# Patient Record
(demographics unavailable — no encounter records)

---

## 2024-10-29 NOTE — HISTORY OF PRESENT ILLNESS
[de-identified] : Patient is here for evaluation of left shoulder pain Affecting quality of life Wakes up at night due to pain  had injection and PT, by another dr in surgeon  NAD Left shoulder: TTP ant GH joint, bicipital groove FF 0-175 ER 60 IR T12 5/5 strength scapular abduction, ER, IR Pos Impingement Pos Steele Pos Cross Arm Adduction Negative instability  XRay left shoulder negative for fracture, dislocation, very mild GH arthritis  Plan went over findings explained the imaging since he has failed cons tx will get an mri left shoulder fu after mri

## 2024-12-05 NOTE — HISTORY OF PRESENT ILLNESS
[de-identified] : Patient is here for evaluation of left shoulder pain Affecting quality of life Wakes up at night due to pain  had injection and PT, by another dr in surgeon  NAD Left shoulder: TTP ant GH joint, bicipital groove FF 0-175 ER 60 IR T12 5/5 strength scapular abduction, ER, IR Pos Impingement Pos Steele Pos Cross Arm Adduction Negative instability  XRay left shoulder negative for fracture, dislocation, very mild GH arthritis  mri left shoudler: high grade to near complete R tear, labral tear, ac arthritis  Plan went over findings explained the imaging op vs nonop and r/b/a explained detail will proceed with surgery   left shoulder arthroscopy, rotator cuff repair, decompression, distal clavicle excision, possible open biceps tenodesis  Operative and nonoperative options discussed with patient. Surgical risks, benefits, and alternatives explained. Surgical risks include but are not exclusive to bleeding, infection, neurovascular damage, continued pain, stiffness, scarring, rsd, dvt/pe, potential failure of surgery that may require further surgery in the future. I went over incisions and rehabilitation. All questions answered.

## 2025-03-20 NOTE — HISTORY OF PRESENT ILLNESS
[de-identified] : Pt is s/p left shoulder arthroscopy Doing well. Pain controlled  NAD Left shoulder Incisions healed Mild diffuse TTP Compartments soft and NT ROM limited secondary to pain NVI  s/p left shoulder arthroscopy went over the surgery in detail will start pt, protocol provided continue pain control as needed fu in 1 month all questions answered

## 2025-04-11 NOTE — DISCUSSION/SUMMARY
[de-identified] : Left shoulder pain  HPI Patient is a 50-year-old male reports to the office for evaluation of his left shoulder pain.  He states that he accidentally tripped and going down the steps earlier today on 4/11/2025.  He outstretched his left upper extremity when he fell which caused him left shoulder pain.  He just underwent left shoulder arthroscopy surgery/rotator cuff repair/OBT done on 3/12/2025 by Dr. Moreau.  Patient now admits to a deformity by his bicep region.  Lifting, certain range of motion, and palpating certain areas aggravate the patient's pain.  Denies any numbness or tingling.  Left shoulder exam is as follows: Campos deformity noted of the left bicep.  Minimal ecchymosis noted anterior proximal biceps region.  No erythema or swelling.  Well-healed incision sites.  Active forward flexion to approximately 150 degrees, passive forward flexion to approximate 170 degrees with mild stiffness.  Active abduction to approximately 100 degrees, passive abduction to approximate 120 degrees with pain.  Internal rotation to L5 and external rotation to 80 degrees with stiffness.  Strength is 4/5.  Positive speeds testing.  Negative Jobes and Taylor's testing.  Light touch intact throughout.  Neurovascular intact.  Assessment/plan Patient recently underwent left shoulder arthroscopy surgery.  He clinically tore his proximal biceps tendon given recent injury.  Left shoulder MRI ordered for further evaluation. Nabumetone 750 mg Rx sent to his pharmacy to help alleviate his symptoms.    He may continue formal PT as directed but I advised him to avoid biceps strengthening/exercises.  The patient was advised to rest/ice the area and may alternate with warm compresses as needed. Follow-up in 2-3 weeks with Dr. Moreau.  All questions/concerns were answered in detail.

## 2025-05-29 NOTE — HISTORY OF PRESENT ILLNESS
[de-identified] : Pt is s/p left shoulder arthroscopy and revision BT Doing well. Pain controlled  NAD Left shoulder Incisions healed biceps in improved position Mild diffuse TTP Compartments soft and NT ROM limited secondary to pain NVI  s/p left shoulder arthroscopy went over the surgery in detail continue pain control as needed fu in 1 month, will restart pt at that time all questions answered

## 2025-07-10 NOTE — DISCUSSION/SUMMARY
[de-identified] : Postop visit #2  HPI Patient is a 51-year-old male reports to office for subsequent reevaluation of his left shoulder pain.  He underwent a left shoulder arthroscopy/rotator cuff repair and open biceps tenodesis on 3/12/2025.  He underwent a second shoulder arthroscopy/revision of open biceps tenodesis on 5/21/2025.  Both surgeries were done by Dr. Moreau.  He has not done formal physical therapy since his second surgery.  He noticed a deformity/lump by his biceps even after the second surgery.  Denies any recent trauma or injury to the area.  He is right-handed.  Lifting, certain range of motion, and palpating certain areas aggravate the patient's pain.  Denies any numbness or tingling.  Left shoulder exam is as follows: Bicep deformity noted.  No erythema or ecchymosis.  Well-healed incision sites.  Active forward flexion/abduction to approximate 150 degrees, passive forward flexion/abduction to approximate 165 degrees with mild stiffness and pain.  Strength is 4/5 with discomfort.  Pain with speeds testing.  Light touch intact throughout.  Neurovascular intact.  Assessment/plan Explained the patient that he clinically may have retore his proximal biceps tendon again.  We will treat this conservatively for now.  A script for physical therapy was printed for the patient so they can get started on that.  OTC NSAIDs as needed for pain.  The patient was advised to rest/ice the area and may alternate with warm compresses as needed.  Instructed not to perform any strenuous activity that may worsen symptoms.  Follow-up on as-needed basis.  All question/concerns were answered in detail.